# Patient Record
Sex: MALE | Race: WHITE | URBAN - METROPOLITAN AREA
[De-identification: names, ages, dates, MRNs, and addresses within clinical notes are randomized per-mention and may not be internally consistent; named-entity substitution may affect disease eponyms.]

---

## 2019-06-01 ENCOUNTER — TRANSFERRED RECORDS (OUTPATIENT)
Dept: HEALTH INFORMATION MANAGEMENT | Facility: CLINIC | Age: 69
End: 2019-06-01

## 2019-06-12 ENCOUNTER — MEDICAL CORRESPONDENCE (OUTPATIENT)
Dept: HEALTH INFORMATION MANAGEMENT | Facility: CLINIC | Age: 69
End: 2019-06-12

## 2019-08-20 DIAGNOSIS — G47.30 SLEEP APNEA: ICD-10-CM

## 2019-08-20 DIAGNOSIS — R06.83 SNORING: Primary | ICD-10-CM

## 2020-02-13 DIAGNOSIS — J18.9 PNEUMONIA: Primary | ICD-10-CM

## 2020-02-14 NOTE — TELEPHONE ENCOUNTER
RECORDS RECEIVED FROM: internal/CE   DATE RECEIVED: 2.17.20   NOTES STATUS DETAILS   OFFICE NOTE from referring provider N/A Self referred    OFFICE NOTE from other specialist N/A    DISCHARGE SUMMARY from hospital Care Everywhere 6.1.19 [facility]    DISCHARGE REPORT from the ER Care Everywhere 8/2/19 [facility]- [facility]  6/18/19 [facility]- [facility]  10/4/18 [facility]  6.13.18- [facility]  4/18/18- [facility]       MEDICATION LIST Internal    IMAGING  (NEED IMAGES AND REPORTS)     CT SCAN In process/CE 2.17.20- scheduled   CE- [facility]- 6/1/19, 6.13.18,    CHEST XRAY (CXR) Care Everywhere [facility]- 8/2/19, 6.13.18, 4/18/18, 2.28.18  NM- 6/1/19   TESTS     PULMONARY FUNCTION TESTING (PFT) In process 2.17.17- scheduled

## 2020-02-17 ENCOUNTER — OFFICE VISIT (OUTPATIENT)
Dept: PULMONOLOGY | Facility: CLINIC | Age: 70
End: 2020-02-17
Attending: INTERNAL MEDICINE
Payer: MEDICARE

## 2020-02-17 ENCOUNTER — PRE VISIT (OUTPATIENT)
Dept: PULMONOLOGY | Facility: CLINIC | Age: 70
End: 2020-02-17

## 2020-02-17 ENCOUNTER — ANCILLARY PROCEDURE (OUTPATIENT)
Dept: CT IMAGING | Facility: CLINIC | Age: 70
End: 2020-02-17
Attending: INTERNAL MEDICINE
Payer: MEDICARE

## 2020-02-17 VITALS
OXYGEN SATURATION: 99 % | WEIGHT: 125 LBS | DIASTOLIC BLOOD PRESSURE: 70 MMHG | SYSTOLIC BLOOD PRESSURE: 107 MMHG | BODY MASS INDEX: 20.83 KG/M2 | HEIGHT: 65 IN | HEART RATE: 81 BPM

## 2020-02-17 DIAGNOSIS — J18.9 PNEUMONIA: ICD-10-CM

## 2020-02-17 DIAGNOSIS — K22.0 ACHALASIA: Primary | ICD-10-CM

## 2020-02-17 DIAGNOSIS — K22.0 ACHALASIA: ICD-10-CM

## 2020-02-17 LAB — CRP SERPL-MCNC: 18.6 MG/L (ref 0–8)

## 2020-02-17 PROCEDURE — 82784 ASSAY IGA/IGD/IGG/IGM EACH: CPT | Performed by: INTERNAL MEDICINE

## 2020-02-17 PROCEDURE — G0463 HOSPITAL OUTPT CLINIC VISIT: HCPCS | Mod: 25,ZF

## 2020-02-17 PROCEDURE — 82787 IGG 1 2 3 OR 4 EACH: CPT | Performed by: INTERNAL MEDICINE

## 2020-02-17 PROCEDURE — 86140 C-REACTIVE PROTEIN: CPT | Performed by: INTERNAL MEDICINE

## 2020-02-17 PROCEDURE — 36415 COLL VENOUS BLD VENIPUNCTURE: CPT | Performed by: INTERNAL MEDICINE

## 2020-02-17 ASSESSMENT — MIFFLIN-ST. JEOR: SCORE: 1258.88

## 2020-02-17 ASSESSMENT — PAIN SCALES - GENERAL: PAINLEVEL: NO PAIN (0)

## 2020-02-17 NOTE — NURSING NOTE
Chief Complaint   Patient presents with     Follow Up     pt is being seen for recurrent bronchitis     Vitals were taken and medications were reconciled.     LAWRENCE Tom

## 2020-02-17 NOTE — LETTER
"2/17/2020       RE: eTo Parsons  [address] [place] [address]     Dear Colleague,    Thank you for referring your patient, Teo Parsons, to the [facility] at [facility]. Please see a copy of my visit note below.    [facility] Health  Clinic Initial Visit Note    Reason for visit: \"Recurrent pneumonia\"    HPI: 70 year old male with hx of multiple back surgeries (both lumbar and cervical fusions), HTN, HLD, hx of achalasia s/p Heller myotomy with fundiplication (2017) and hx of recurrent pneumonia (dating back to 2015) seen for initial visit.     Patient notes his first episode of pneumonia occurred in 2015 when he was admitted to [facility] and found to have an empyema requiring VATS, decortication and chest tube placement. A pleural biopsy at that time showed inflammation and marked eosinophilia. He was admitted for 9 days.     Since that time, he was had multiple admissions for recurrent pneumonia (by his count close to 9 times, almost all at [facility]).  Extensive amount of imaging reviewed back to 2015 showed multiple CXR and CT scans with diffuse bilateral infiltrates that seem to coincide with him presenting with worse symptoms. However, there are CXR which show complete resolution of these infiltrates. His most recent admission was June 2019 when he was admitted to [facility] from 6/1-6/3. He was noted to be hypoxic on arrival (75% on RA) and initially required high O2 requirements. He was treated with abx and discharged on 6/3 improved. Interestingly, he had elevated eosinophils on CBC on admission.     Further chart review showed bronch/BAL/TBBx in 2016. Pathology was unremarkable as well. Previous has grown E coli in sputum as well as previous + strep pneumo Ag (2017) and + influenza in 2016. Immunoglobulins were checked in 2015 and were normal.     He has previously been followed by pulmonary at [facility] (last saw  " Meir and had been maintained on Flovent and/or Advair over the years. He currently has not been taking any inhaler regularly. He does have nebulizer that he finds helpful when he does use it, which it seems is relatively infrequently.     He has a hx of achalasia and underwent Heller myotomy with fundiplication in 2017. He has not had recent GI follow up but remains on treatment for acid reflux with prevacid. In addition, he has had multiple back fusion surgeries (both lumbar and cervical) and has quite significant back pain that limits his mobility some what.     Today he reports some mild chest congestion and feels like he has a mild cold. He denies significant cough at this time. However, despite this, he remains relatively active and will be flying to [place] tomorrow to ski.     Pulmonary function test today show normal spirometry, lung volume and diffusion capacity.      PMH:  Past Medical History:   Diagnosis Date     BENIGN HYPERTENSION 11/13/2002     GENERALIZED ANXIETY DIS 12/20/2006     Hyperlipidemia LDL goal <130 10/31/2010     INSOMNIA NEC 3/8/2005     LABYRINTHITIS NOS 11/13/2002     LBP (low back pain) 1/18/2010     NONINFEC GASTROENTERIT NEC 4/10/2003     Other reasons for seeking consultation 9/8/2011     Substance abuse (H) 1/21/2011       Allergies:  Allergies   Allergen Reactions     Lentil Anaphylaxis     Crustaceans      anaphylaxis with shellfish     Diovan [Valsartan]      Sulfa Drugs      rash as a child       Social History:  Social History     Socioeconomic History     Marital status:      Spouse name: Not on file     Number of children: Not on file     Years of education: Not on file     Highest education level: Not on file   Occupational History     Not on file   Social Needs     Financial resource strain: Not on file     Food insecurity:     Worry: Not on file     Inability: Not on file     Transportation needs:     Medical: Not on file     Non-medical: Not on file    Tobacco Use     Smoking status: Never Smoker     Smokeless tobacco: Never Used   Substance and Sexual Activity     Alcohol use: Yes     Comment: OCC.     Drug use: No     Sexual activity: Yes     Partners: Female   Lifestyle     Physical activity:     Days per week: Not on file     Minutes per session: Not on file     Stress: Not on file   Relationships     Social connections:     Talks on phone: Not on file     Gets together: Not on file     Attends [employer] service: Not on file     Active member of club or organization: Not on file     Attends meetings of clubs or organizations: Not on file     Relationship status: Not on file     Intimate partner violence:     Fear of current or ex partner: Not on file     Emotionally abused: Not on file     Physically abused: Not on file     Forced sexual activity: Not on file   Other Topics Concern     Not on file   Social History Narrative     Not on file       History of smoking: NO  Active user: NO     History of alcohol use: YES  Amount: social   Active user: YES      History of recreational drug use: NO  Active user: NO    He is not currently employed.  Previously worked as an  and .      The patient is .  Has no children.    Hot Tub Exposure: NO  Recent Travel:  YES   Hx of incarceration:  NO  Bird Exposure:   NO  Animal Exposure:  NO  Inhalation Exposure:  NO  Hobbies:    NO    Medications:  Current Outpatient Medications   Medication Sig Dispense Refill     albuterol (PROVENTIL HFA: VENTOLIN HFA) 108 (90 BASE) MCG/ACT inhaler Inhale 2 puffs into the lungs every 6 hours. 1 Inhaler 8     amitriptyline (ELAVIL) 50 MG tablet Take 1 tablet (50 mg) by mouth At Bedtime 1 TABLET DAILY in the pm but needs follow-up appointment upon return. 10 tablet 0     aspirin 325 MG EC tablet Take 1 tablet by mouth daily. 1 tab po QD (Once per day-AM) 90 tablet 2     citalopram (CELEXA) 40 MG tablet Take 1 tablet (40 mg) by mouth daily Short supply given,  "patient is overdue for clinic visit and will need such prior to further refills 15 tablet 0     cyclobenzaprine (FLEXERIL) 10 MG tablet Take 1 tablet by mouth 3 times daily. 60 tablet 0     EPINEPHrine (EPIPEN) 0.3 MG/0.3ML injection Inject 0.3 mLs into the muscle once as needed for anaphylaxis. 2 each 1     ezetimibe (ZETIA) 10 MG tablet Take 1 tablet by mouth daily. 1 TABLET DAILY-PM 30 tablet 11     fexofenadine (ALLEGRA) 180 MG tablet Take 1 tablet by mouth daily. 30 tablet 7     finasteride (PROPECIA) 1 MG tablet Take 1 tablet by mouth daily. 90 tablet 3     Folic Acid-Vit B6-Vit B12 (FOLBEE) 2.5-25-1 MG TABS 1 qd-PM 90 tablet 0     irbesartan (AVAPRO) 150 MG tablet Take 1 tablet by mouth 2 times daily. 60 tablet 0     LANsoprazole (PREVACID) 30 MG capsule Take 1 capsule by mouth every morning (before breakfast). 90 capsule 0     mometasone (NASONEX) 50 MCG/ACT nasal spray 2 sprays by Both Nostrils route daily. for allergies 3 Box 2     rosuvastatin (CRESTOR) 20 MG tablet Take 1 tablet by mouth daily. 30 tablet 11     sildenafil (VIAGRA) 100 MG tablet Take 0.5-1 tablets by mouth daily as needed for erectile dysfunction. Take 30 min to 4 hours before intercourse.  Never use with nitroglycerin, terazosin or doxazosin. 12 tablet 5     zolpidem (AMBIEN CR) 12.5 MG CR tablet Take 1 tablet by mouth nightly as needed for sleep. 30 tablet 0     LORazepam (ATIVAN) 0.5 MG tablet Take 1 tablet by mouth every 6 hours as needed for anxiety. (Patient not taking: Reported on 2/17/2020) 60 tablet 0       Family History:  No known hx of lung disease    Review of Systems:   Complete 10 point ROS negative unless mentioned in HPI    Physical Exam:  /70   Pulse 81   Ht 1.651 m (5' 5\")   Wt 56.7 kg (125 lb)   SpO2 99%   BMI 20.80 kg/m     General:  Adult male;appears stated age; no acute distress; the patient is a good historian  HEENT:  NCAT; EOMI; No icterus; no injection; MMM  Neck: Supple, full range of motion, no " lymphadenopathy  Pulm: rhonchi most prominent in the RUL. Remainder of lungs are clear.  CV: RRR, no murmurs, rubs or gallops  Abdomen: Soft, NT, ND, + BS  Extremities:  No cyanosis or clubbing, no edema  Neuro: Alert and oriented x 3, moves all extremities no obvious weakness  Skin: No skin rashes noted    Labs and Radiology:  Labs personally reviewed by me.     Imaging dating back to 2015 personally reviewed. CT and CXR findings summarized in the HPI.    PFT's:        PFT Interpretation:  Personally reviewed by me and showed normal spirometry, normal lung volumes and normal diffusion capacity.     Assessment and Plan:  Teo Parsons is a 70 year old male with hx of multiple back surgeries (both lumbar and cervical fusions), HTN, HLD, hx of achalasia s/p Heller myotomy with fundiplication (2017) and hx of recurrent pneumonia (dating back to 2015) seen for initial visit.      Recurrent pneumonia: dating back to 2015, with his last admission in June 2019. The etiology of his recurrent infections is not entirely clear but a number of things are possibly playing a role. Certainly, his achalasia is a contributing factor and probably is causing some degree of lana or silent aspiration. While he has undergone definitive surgical treatment in 2017, he has not had formal follow up or recent swallow evaluation.since then. Ideally, would like GI to see him and decide what surveillance may be needed and what imaging would be most appropriate to assess his swallowing, His immunoglobulins were normal in 2015 but an underlying immunodeficiency could be contributing to his recurrent infections, so will check them today. Furthermore, his back fusion may be exacerbating potential aspiration as it relates to potential restriction on chest wall compliance. He is scheduled for a CT chest following this appointment. If bronchiectasis is present, more aggressive airway clearance could be an option.     -Check CRP and Immunoglobulins    -CT chest today   -GI referral for assessment of achalasia and formal swallow evaluation     Vaccinations: up to date on pneumococcal and influenza vaccines.      Return to clinic in 3 months.     Florencio Price MD   of Medicine   Division of Pulmonary, Allergy, Critical Care and Sleep Medicine  [facility]  Pager: [phone]    The above note was dictated using voice recognition software and may include typographical errors. Please contact the author for any clarifications.        Again, thank you for allowing me to participate in the care of your patient.      Sincerely,    Florencio Price MD

## 2020-02-17 NOTE — PROGRESS NOTES
"[facility] Lung Science and Health  Clinic Initial Visit Note    Reason for visit: \"Recurrent pneumonia\"    HPI: 70 year old male with hx of multiple back surgeries (both lumbar and cervical fusions), HTN, HLD, hx of achalasia s/p Heller myotomy with fundiplication (2017) and hx of recurrent pneumonia (dating back to 2015) seen for initial visit.     Patient notes his first episode of pneumonia occurred in 2015 when he was admitted to [facility] and found to have an empyema requiring VATS, decortication and chest tube placement. A pleural biopsy at that time showed inflammation and marked eosinophilia. He was admitted for 9 days.     Since that time, he was had multiple admissions for recurrent pneumonia (by his count close to 9 times, almost all at [facility]).  Extensive amount of imaging reviewed back to 2015 showed multiple CXR and CT scans with diffuse bilateral infiltrates that seem to coincide with him presenting with worse symptoms. However, there are CXR which show complete resolution of these infiltrates. His most recent admission was June 2019 when he was admitted to [facility] from 6/1-6/3. He was noted to be hypoxic on arrival (75% on RA) and initially required high O2 requirements. He was treated with abx and discharged on 6/3 improved. Interestingly, he had elevated eosinophils on CBC on admission.     Further chart review showed bronch/BAL/TBBx in 2016. Pathology was unremarkable as well. Previous has grown E coli in sputum as well as previous + strep pneumo Ag (2017) and + influenza in 2016. Immunoglobulins were checked in 2015 and were normal.     He has previously been followed by pulmonary at [facility] (last saw Dr. Ospina) and had been maintained on Flovent and/or Advair over the years. He currently has not been taking any inhaler regularly. He does have nebulizer that he finds helpful when he does use it, which it seems is relatively infrequently.     He has a hx of achalasia and underwent Heller myotomy with " fundiplication in 2017. He has not had recent GI follow up but remains on treatment for acid reflux with prevacid. In addition, he has had multiple back fusion surgeries (both lumbar and cervical) and has quite significant back pain that limits his mobility some what.     Today he reports some mild chest congestion and feels like he has a mild cold. He denies significant cough at this time. However, despite this, he remains relatively active and will be flying to [place] tomorrow to ski.     Pulmonary function test today show normal spirometry, lung volume and diffusion capacity.      PMH:  Past Medical History:   Diagnosis Date     BENIGN HYPERTENSION 11/13/2002     GENERALIZED ANXIETY DIS 12/20/2006     Hyperlipidemia LDL goal <130 10/31/2010     INSOMNIA NEC 3/8/2005     LABYRINTHITIS NOS 11/13/2002     LBP (low back pain) 1/18/2010     NONINFEC GASTROENTERIT NEC 4/10/2003     Other reasons for seeking consultation 9/8/2011     Substance abuse (H) 1/21/2011       Allergies:  Allergies   Allergen Reactions     Lentil Anaphylaxis     Crustaceans      anaphylaxis with shellfish     Diovan [Valsartan]      Sulfa Drugs      rash as a child       Social History:  Social History     Socioeconomic History     Marital status:      Spouse name: Not on file     Number of children: Not on file     Years of education: Not on file     Highest education level: Not on file   Occupational History     Not on file   Social Needs     Financial resource strain: Not on file     Food insecurity:     Worry: Not on file     Inability: Not on file     Transportation needs:     Medical: Not on file     Non-medical: Not on file   Tobacco Use     Smoking status: Never Smoker     Smokeless tobacco: Never Used   Substance and Sexual Activity     Alcohol use: Yes     Comment: OCC.     Drug use: No     Sexual activity: Yes     Partners: Female   Lifestyle     Physical activity:     Days per week: Not on file     Minutes per session: Not  on file     Stress: Not on file   Relationships     Social connections:     Talks on phone: Not on file     Gets together: Not on file     Attends [employer] service: Not on file     Active member of club or organization: Not on file     Attends meetings of clubs or organizations: Not on file     Relationship status: Not on file     Intimate partner violence:     Fear of current or ex partner: Not on file     Emotionally abused: Not on file     Physically abused: Not on file     Forced sexual activity: Not on file   Other Topics Concern     Not on file   Social History Narrative     Not on file       History of smoking: NO  Active user: NO     History of alcohol use: YES  Amount: social   Active user: YES      History of recreational drug use: NO  Active user: NO    He is not currently employed.  Previously worked as an  and .      The patient is .  Has no children.    Hot Tub Exposure: NO  Recent Travel:  YES   Hx of incarceration:  NO  Bird Exposure:   NO  Animal Exposure:  NO  Inhalation Exposure:  NO  Hobbies:    NO    Medications:  Current Outpatient Medications   Medication Sig Dispense Refill     albuterol (PROVENTIL HFA: VENTOLIN HFA) 108 (90 BASE) MCG/ACT inhaler Inhale 2 puffs into the lungs every 6 hours. 1 Inhaler 8     amitriptyline (ELAVIL) 50 MG tablet Take 1 tablet (50 mg) by mouth At Bedtime 1 TABLET DAILY in the pm but needs follow-up appointment upon return. 10 tablet 0     aspirin 325 MG EC tablet Take 1 tablet by mouth daily. 1 tab po QD (Once per day-AM) 90 tablet 2     citalopram (CELEXA) 40 MG tablet Take 1 tablet (40 mg) by mouth daily Short supply given, patient is overdue for clinic visit and will need such prior to further refills 15 tablet 0     cyclobenzaprine (FLEXERIL) 10 MG tablet Take 1 tablet by mouth 3 times daily. 60 tablet 0     EPINEPHrine (EPIPEN) 0.3 MG/0.3ML injection Inject 0.3 mLs into the muscle once as needed for anaphylaxis. 2 each 1      "ezetimibe (ZETIA) 10 MG tablet Take 1 tablet by mouth daily. 1 TABLET DAILY-PM 30 tablet 11     fexofenadine (ALLEGRA) 180 MG tablet Take 1 tablet by mouth daily. 30 tablet 7     finasteride (PROPECIA) 1 MG tablet Take 1 tablet by mouth daily. 90 tablet 3     Folic Acid-Vit B6-Vit B12 (FOLBEE) 2.5-25-1 MG TABS 1 qd-PM 90 tablet 0     irbesartan (AVAPRO) 150 MG tablet Take 1 tablet by mouth 2 times daily. 60 tablet 0     LANsoprazole (PREVACID) 30 MG capsule Take 1 capsule by mouth every morning (before breakfast). 90 capsule 0     mometasone (NASONEX) 50 MCG/ACT nasal spray 2 sprays by Both Nostrils route daily. for allergies 3 Box 2     rosuvastatin (CRESTOR) 20 MG tablet Take 1 tablet by mouth daily. 30 tablet 11     sildenafil (VIAGRA) 100 MG tablet Take 0.5-1 tablets by mouth daily as needed for erectile dysfunction. Take 30 min to 4 hours before intercourse.  Never use with nitroglycerin, terazosin or doxazosin. 12 tablet 5     zolpidem (AMBIEN CR) 12.5 MG CR tablet Take 1 tablet by mouth nightly as needed for sleep. 30 tablet 0     LORazepam (ATIVAN) 0.5 MG tablet Take 1 tablet by mouth every 6 hours as needed for anxiety. (Patient not taking: Reported on 2/17/2020) 60 tablet 0       Family History:  No known hx of lung disease    Review of Systems:   Complete 10 point ROS negative unless mentioned in HPI    Physical Exam:  /70   Pulse 81   Ht 1.651 m (5' 5\")   Wt 56.7 kg (125 lb)   SpO2 99%   BMI 20.80 kg/m    General:  Adult male;appears stated age; no acute distress; the patient is a good historian  HEENT:  NCAT; EOMI; No icterus; no injection; MMM  Neck: Supple, full range of motion, no lymphadenopathy  Pulm: rhonchi most prominent in the RUL. Remainder of lungs are clear.  CV: RRR, no murmurs, rubs or gallops  Abdomen: Soft, NT, ND, + BS  Extremities:  No cyanosis or clubbing, no edema  Neuro: Alert and oriented x 3, moves all extremities no obvious weakness  Skin: No skin rashes noted    Labs " and Radiology:  Labs personally reviewed by me.     Imaging dating back to 2015 personally reviewed. CT and CXR findings summarized in the HPI.    PFT's:        PFT Interpretation:  Personally reviewed by me and showed normal spirometry, normal lung volumes and normal diffusion capacity.     Assessment and Plan:  Teo Parsons is a 70 year old male with hx of multiple back surgeries (both lumbar and cervical fusions), HTN, HLD, hx of achalasia s/p Heller myotomy with fundiplication (2017) and hx of recurrent pneumonia (dating back to 2015) seen for initial visit.      Recurrent pneumonia: dating back to 2015, with his last admission in June 2019. The etiology of his recurrent infections is not entirely clear but a number of things are possibly playing a role. Certainly, his achalasia is a contributing factor and probably is causing some degree of lana or silent aspiration. While he has undergone definitive surgical treatment in 2017, he has not had formal follow up or recent swallow evaluation.since then. Ideally, would like GI to see him and decide what surveillance may be needed and what imaging would be most appropriate to assess his swallowing, His immunoglobulins were normal in 2015 but an underlying immunodeficiency could be contributing to his recurrent infections, so will check them today. Furthermore, his back fusion may be exacerbating potential aspiration as it relates to potential restriction on chest wall compliance. He is scheduled for a CT chest following this appointment. If bronchiectasis is present, more aggressive airway clearance could be an option.     -Check CRP and Immunoglobulins   -CT chest today   -GI referral for assessment of achalasia and formal swallow evaluation     Vaccinations: up to date on pneumococcal and influenza vaccines.      Return to clinic in 3 months.     Florencio Price MD   of Medicine   Division of Pulmonary, Allergy, Critical Care and Sleep  [facility]  Pager: [phone]    The above note was dictated using voice recognition software and may include typographical errors. Please contact the author for any clarifications.

## 2020-02-18 LAB
DLCOUNC-%PRED-PRE: 83 %
DLCOUNC-PRE: 18.55 ML/MIN/MMHG
DLCOUNC-PRED: 22.25 ML/MIN/MMHG
ERV-%PRED-PRE: 109 %
ERV-PRE: 1.29 L
ERV-PRED: 1.18 L
EXPTIME-PRE: 7.23 SEC
FEF2575-%PRED-PRE: 78 %
FEF2575-PRE: 1.72 L/SEC
FEF2575-PRED: 2.21 L/SEC
FEFMAX-%PRED-PRE: 93 %
FEFMAX-PRE: 6.9 L/SEC
FEFMAX-PRED: 7.37 L/SEC
FEV1-%PRED-PRE: 95 %
FEV1-PRE: 2.63 L
FEV1FEV6-PRE: 71 %
FEV1FEV6-PRED: 78 %
FEV1FVC-PRE: 71 %
FEV1FVC-PRED: 77 %
FEV1SVC-PRE: 70 %
FEV1SVC-PRED: 71 %
FIFMAX-PRE: 6.49 L/SEC
FRCPLETH-%PRED-PRE: 96 %
FRCPLETH-PRE: 3.26 L
FRCPLETH-PRED: 3.39 L
FVC-%PRED-PRE: 103 %
FVC-PRE: 3.72 L
FVC-PRED: 3.59 L
IC-%PRED-PRE: 91 %
IC-PRE: 2.48 L
IC-PRED: 2.73 L
IGA SERPL-MCNC: 467 MG/DL (ref 84–499)
IGG SERPL-MCNC: 1060 MG/DL (ref 610–1616)
IGG1 SER-MCNC: 594 MG/DL (ref 382–929)
IGG2 SER-MCNC: 395 MG/DL (ref 242–700)
IGG3 SER-MCNC: 30 MG/DL (ref 22–176)
IGG4 SER-MCNC: 17 MG/DL (ref 4–86)
IGM SERPL-MCNC: 94 MG/DL (ref 35–242)
RVPLETH-%PRED-PRE: 80 %
RVPLETH-PRE: 1.97 L
RVPLETH-PRED: 2.45 L
TLCPLETH-%PRED-PRE: 94 %
TLCPLETH-PRE: 5.75 L
TLCPLETH-PRED: 6.11 L
VA-%PRED-PRE: 91 %
VA-PRE: 4.91 L
VC-%PRED-PRE: 96 %
VC-PRE: 3.78 L
VC-PRED: 3.91 L

## 2020-06-04 ENCOUNTER — DOCUMENTATION ONLY (OUTPATIENT)
Dept: CARE COORDINATION | Facility: CLINIC | Age: 70
End: 2020-06-04

## 2020-06-24 ENCOUNTER — VIRTUAL VISIT (OUTPATIENT)
Dept: PULMONOLOGY | Facility: CLINIC | Age: 70
End: 2020-06-24
Attending: INTERNAL MEDICINE
Payer: MEDICARE

## 2020-06-24 DIAGNOSIS — K22.0 ACHALASIA: Primary | ICD-10-CM

## 2020-06-24 NOTE — PROGRESS NOTES
"Teo Parsons is a 70 year old male who is being evaluated via a billable telephone visit.      The patient has been notified of following:     \"This telephone visit will be conducted via a call between you and your physician/provider. We have found that certain health care needs can be provided without the need for a physical exam.  This service lets us provide the care you need with a short phone conversation.  If a prescription is necessary we can send it directly to your pharmacy.  If lab work is needed we can place an order for that and you can then stop by our lab to have the test done at a later time.    Telephone visits are billed at different rates depending on your insurance coverage. During this emergency period, for some insurers they may be billed the same as an in-person visit.  Please reach out to your insurance provider with any questions.    If during the course of the call the physician/provider feels a telephone visit is not appropriate, you will not be charged for this service.\"    Patient has given verbal consent for Telephone visit?  Yes    What phone number would you like to be contacted at? [phone]    How would you like to obtain your AVS? Mail a copy    Phone call duration: 25 minutes      Teo Parsons is a 69 yo male with PMH of recurrent pneumonias c/b empyema w/ VATS decortication, achalasia s/p Heller myotomy w/ fundiplication, lumbar and cervical fusion surgeries, and HTN who presents for follow up.    Initially seen in Pulmonary clinic by Dr. Price 2/17/2020. History of recurrent pneumonias dating back to 2015 when reportedly underwent VATS decortication for an empyema. Review of Care Everywhere shows prior infectious workup including negative Histoplasma, HIV, AFB stains. Sputum cultures have grown various organisms including E.coli, Klebsiella aerogenes, Serratia. Suspicion for silent aspiration in setting of achalasia and Dr. Price referred to GI for further " evaluation. Immunoglobulins rechecked to rule out immunodeficiency and were normal. CT chest showed improved multifocal opacities, no bronchiectasis.     Follows primarily within [facility] system. At the end of February admitted for AMS in setting of bilateral pneumonia. Treated with Unasyn, evaluated by Speech with mild-moderate oral pharyngeal dysphagia. It was discussed that he is at risk of aspirations and patient stated awareness of recommendations but did not wish to comply. In May having increased dyspnea with low grade temps, curbside Covid testing negative.     Today reports that he can feel a heaviness in his cough, chest, and upper airways that tell him when a pneumonia is coming on. Most of the time does not produce sputum but will have yellow green sputum when sick, never any hemoptysis. Last course of antibiotics from his PCP in the end of March. Does have albuterol nebs that now using daily as a preventative measure, thinks they help.Is taking Prevacid which he also thinks may be helping. Has minor allergy symptoms at times and will take Nasonex and Allegra as needed.     Cannot tie illnesses to a specific episodes of choking or dysphagia prior. He states being very careful with eating. Does not limit what foods he eats but does drink mostly thickened liquids or carbonated liquids which he was told help. Tries to take small bites, not experiencing clear issues while eating. Says he is not ready or willing to have a feeding tube yet. When asked states that someone talked to him about this in the past, not sure who or when. Has seen GI in the past but not sure of the last time he was seen.     Assessment and Plan  Recurrent Pneumonia  Dysphagia  Numerous episodes of pneumonia, some requiring hospitalization, over the past five years. Majority of records in Care Everywhere and show extensive workup in past. Unlikely atypical infection, has tested negative for fungal or AFB organisms. Unlikely  immunodeficiency given negative HIV and normal immunoglobulins. Has not received workup for inflammatory pneumonia or vasculitis but rarely treated with steroids and largely improves with antibiotics.     Agree the most likely cause is recurrent aspiration in setting of prior achalasia. Dysphagia noted by Speech Therapy during recent hospitalization in March and it was documented that patient aware of risks from aspiration but did not wish to comply with dysphagia recommendations. Notes from this hospitalization also mention use of alcohol and pain medications which regrettably did not discuss with him today as this could further place at risk for aspiration. Patient has instituted some diet modifications. Did review in some length that a choice often needs to be made between a more restrictive diet that is lower risk vs eating all foods and being at higher risk of aspiration. Patient reports that a feeding tube has previously been discussed with him and he is not ready for it. Given length of time since last GI evaluation will refer to ensure no further steps can be taken to minimize dysphagia and aspiration. Believe their input will be necessary prior to any further discussion about feeding tube.     Patient discussed with Dr. Mera Santiago MD PhD  Pulmonary Critical Care Fellow  [phone]      Attending note:  Discussed on telephone with patient and Dr. Santiago.  All patient's questions were answered.  I agree with the assessment and plan as outlined in the above note.  Recurrent cough and pneumonia likely due to recurrent aspiration. Has been discussed of feeding tube in the past.  Needs follow-up with GI to assess if anything else can be done; may ultimately need feeding tube.      Gloria Tesfaye MD  [phone]                  None

## 2020-06-24 NOTE — LETTER
6/24/2020         RE: Teo Parsons  [address] [place] [address]        Dear Colleague,    Thank you for referring your patient, Teo Parsons, to the [facility]. Please see a copy of my visit note below.    Teo Parsons is a 70 year old male who is being evaluated via a billable telephone visit.          Phone call duration: 25 minutes      Teo Parsons is a 71 yo male with PMH of recurrent pneumonias c/b empyema w/ VATS decortication, achalasia s/p Heller myotomy w/ fundiplication, lumbar and cervical fusion surgeries, and HTN who presents for follow up.    Initially seen in Pulmonary clinic by Dr. Price 2/17/2020. History of recurrent pneumonias dating back to 2015 when reportedly underwent VATS decortication for an empyema. Review of Care Everywhere shows prior infectious workup including negative Histoplasma, HIV, AFB stains. Sputum cultures have grown various organisms including E.coli, Klebsiella aerogenes, Serratia. Suspicion for silent aspiration in setting of achalasia and Dr. Price referred to GI for further evaluation. Immunoglobulins rechecked to rule out immunodeficiency and were normal. CT chest showed improved multifocal opacities, no bronchiectasis.     Follows primarily within [facility] system. At the end of February admitted for AMS in setting of bilateral pneumonia. Treated with Unasyn, evaluated by Speech with mild-moderate oral pharyngeal dysphagia. It was discussed that he is at risk of aspirations and patient stated awareness of recommendations but did not wish to comply. In May having increased dyspnea with low grade temps, curbside Covid testing negative.     Today reports that he can feel a heaviness in his cough, chest, and upper airways that tell him when a pneumonia is coming on. Most of the time does not produce sputum but will have yellow green sputum when sick, never any hemoptysis. Last course of antibiotics from his PCP in  the end of March. Does have albuterol nebs that now using daily as a preventative measure, thinks they help.Is taking Prevacid which he also thinks may be helping. Has minor allergy symptoms at times and will take Nasonex and Allegra as needed.     Cannot tie illnesses to a specific episodes of choking or dysphagia prior. He states being very careful with eating. Does not limit what foods he eats but does drink mostly thickened liquids or carbonated liquids which he was told help. Tries to take small bites, not experiencing clear issues while eating. Says he is not ready or willing to have a feeding tube yet. When asked states that someone talked to him about this in the past, not sure who or when. Has seen GI in the past but not sure of the last time he was seen.     Assessment and Plan  Recurrent Pneumonia  Dysphagia  Numerous episodes of pneumonia, some requiring hospitalization, over the past five years. Majority of records in Care Everywhere and show extensive workup in past. Unlikely atypical infection, has tested negative for fungal or AFB organisms. Unlikely immunodeficiency given negative HIV and normal immunoglobulins. Has not received workup for inflammatory pneumonia or vasculitis but rarely treated with steroids and largely improves with antibiotics.     Agree the most likely cause is recurrent aspiration in setting of prior achalasia. Dysphagia noted by Speech Therapy during recent hospitalization in March and it was documented that patient aware of risks from aspiration but did not wish to comply with dysphagia recommendations. Notes from this hospitalization also mention use of alcohol and pain medications which regrettably did not discuss with him today as this could further place at risk for aspiration. Patient has instituted some diet modifications. Did review in some length that a choice often needs to be made between a more restrictive diet that is lower risk vs eating all foods and being at  higher risk of aspiration. Patient reports that a feeding tube has previously been discussed with him and he is not ready for it. Given length of time since last GI evaluation will refer to ensure no further steps can be taken to minimize dysphagia and aspiration. Believe their input will be necessary prior to any further discussion about feeding tube.     Patient discussed with Dr. Mera Santiago MD PhD  Pulmonary Critical Care Fellow  [phone]      Attending note:  Discussed on telephone with patient and Dr. Santiago.  All patient's questions were answered.  I agree with the assessment and plan as outlined in the above note.  Recurrent cough and pneumonia likely due to recurrent aspiration. Has been discussed of feeding tube in the past.  Needs follow-up with GI to assess if anything else can be done; may ultimately need feeding tube.      Gloria Tesfaye MD  [phone]                   Again, thank you for allowing me to participate in the care of your patient.        Sincerely,        Neyda Santiago MD

## 2020-06-25 NOTE — PATIENT INSTRUCTIONS
Referral placed to Gastroenterology to discuss if there are any further options for the treatment of dysphagia.    Continue using albuterol neb as needed.    Clinic will help set up follow up in the next 3-6 months